# Patient Record
Sex: MALE | Race: WHITE
[De-identification: names, ages, dates, MRNs, and addresses within clinical notes are randomized per-mention and may not be internally consistent; named-entity substitution may affect disease eponyms.]

---

## 2017-03-14 ENCOUNTER — APPOINTMENT (OUTPATIENT)
Dept: ELECTROPHYSIOLOGY | Facility: CLINIC | Age: 67
End: 2017-03-14

## 2017-06-12 ENCOUNTER — APPOINTMENT (OUTPATIENT)
Dept: ELECTROPHYSIOLOGY | Facility: CLINIC | Age: 67
End: 2017-06-12

## 2017-07-12 ENCOUNTER — APPOINTMENT (OUTPATIENT)
Dept: ELECTROPHYSIOLOGY | Facility: CLINIC | Age: 67
End: 2017-07-12

## 2017-08-10 ENCOUNTER — APPOINTMENT (OUTPATIENT)
Dept: ELECTROPHYSIOLOGY | Facility: CLINIC | Age: 67
End: 2017-08-10
Payer: MEDICARE

## 2017-08-10 VITALS
HEIGHT: 67 IN | HEART RATE: 67 BPM | BODY MASS INDEX: 23.86 KG/M2 | SYSTOLIC BLOOD PRESSURE: 122 MMHG | WEIGHT: 152 LBS | DIASTOLIC BLOOD PRESSURE: 82 MMHG

## 2017-08-10 PROCEDURE — 93280 PM DEVICE PROGR EVAL DUAL: CPT

## 2017-11-16 ENCOUNTER — APPOINTMENT (OUTPATIENT)
Dept: ELECTROPHYSIOLOGY | Facility: CLINIC | Age: 67
End: 2017-11-16
Payer: MEDICARE

## 2017-11-16 PROCEDURE — 93294 REM INTERROG EVL PM/LDLS PM: CPT

## 2017-11-16 PROCEDURE — 93296 REM INTERROG EVL PM/IDS: CPT

## 2018-03-13 ENCOUNTER — APPOINTMENT (OUTPATIENT)
Dept: ELECTROPHYSIOLOGY | Facility: CLINIC | Age: 68
End: 2018-03-13
Payer: MEDICARE

## 2018-03-13 PROCEDURE — 93294 REM INTERROG EVL PM/LDLS PM: CPT

## 2018-03-13 PROCEDURE — 93296 REM INTERROG EVL PM/IDS: CPT

## 2018-06-19 ENCOUNTER — APPOINTMENT (OUTPATIENT)
Dept: ELECTROPHYSIOLOGY | Facility: CLINIC | Age: 68
End: 2018-06-19
Payer: MEDICARE

## 2018-06-19 PROCEDURE — 93296 REM INTERROG EVL PM/IDS: CPT

## 2018-06-19 PROCEDURE — 93294 REM INTERROG EVL PM/LDLS PM: CPT

## 2018-08-29 ENCOUNTER — APPOINTMENT (OUTPATIENT)
Dept: ELECTROPHYSIOLOGY | Facility: CLINIC | Age: 68
End: 2018-08-29
Payer: MEDICARE

## 2018-08-29 VITALS
HEIGHT: 67 IN | WEIGHT: 152 LBS | BODY MASS INDEX: 23.86 KG/M2 | DIASTOLIC BLOOD PRESSURE: 76 MMHG | SYSTOLIC BLOOD PRESSURE: 122 MMHG | HEART RATE: 72 BPM

## 2018-08-29 PROCEDURE — 93280 PM DEVICE PROGR EVAL DUAL: CPT

## 2018-10-17 ENCOUNTER — APPOINTMENT (OUTPATIENT)
Dept: ELECTROPHYSIOLOGY | Facility: CLINIC | Age: 68
End: 2018-10-17
Payer: MEDICARE

## 2018-10-17 VITALS
WEIGHT: 152 LBS | HEART RATE: 68 BPM | BODY MASS INDEX: 23.86 KG/M2 | SYSTOLIC BLOOD PRESSURE: 130 MMHG | DIASTOLIC BLOOD PRESSURE: 82 MMHG | HEIGHT: 67 IN

## 2018-10-17 DIAGNOSIS — Z78.9 OTHER SPECIFIED HEALTH STATUS: ICD-10-CM

## 2018-10-17 PROCEDURE — 93280 PM DEVICE PROGR EVAL DUAL: CPT

## 2018-10-17 RX ORDER — VALSARTAN 160 MG/1
160 TABLET, COATED ORAL
Refills: 0 | Status: ACTIVE | COMMUNITY

## 2018-11-21 ENCOUNTER — APPOINTMENT (OUTPATIENT)
Dept: ELECTROPHYSIOLOGY | Facility: CLINIC | Age: 68
End: 2018-11-21
Payer: MEDICARE

## 2018-11-21 PROCEDURE — 93296 REM INTERROG EVL PM/IDS: CPT

## 2018-11-21 PROCEDURE — 93294 REM INTERROG EVL PM/LDLS PM: CPT

## 2019-02-25 ENCOUNTER — APPOINTMENT (OUTPATIENT)
Dept: ELECTROPHYSIOLOGY | Facility: CLINIC | Age: 69
End: 2019-02-25

## 2019-05-30 ENCOUNTER — APPOINTMENT (OUTPATIENT)
Dept: ELECTROPHYSIOLOGY | Facility: CLINIC | Age: 69
End: 2019-05-30
Payer: MEDICARE

## 2019-05-30 PROCEDURE — 93296 REM INTERROG EVL PM/IDS: CPT

## 2019-05-30 PROCEDURE — 93294 REM INTERROG EVL PM/LDLS PM: CPT

## 2019-08-29 ENCOUNTER — APPOINTMENT (OUTPATIENT)
Dept: ELECTROPHYSIOLOGY | Facility: CLINIC | Age: 69
End: 2019-08-29
Payer: MEDICARE

## 2019-08-29 VITALS
BODY MASS INDEX: 23.86 KG/M2 | SYSTOLIC BLOOD PRESSURE: 126 MMHG | HEART RATE: 64 BPM | WEIGHT: 152 LBS | DIASTOLIC BLOOD PRESSURE: 80 MMHG | HEIGHT: 67 IN

## 2019-08-29 PROCEDURE — 93280 PM DEVICE PROGR EVAL DUAL: CPT

## 2019-11-25 ENCOUNTER — APPOINTMENT (OUTPATIENT)
Dept: ELECTROPHYSIOLOGY | Facility: CLINIC | Age: 69
End: 2019-11-25
Payer: MEDICARE

## 2019-11-25 PROCEDURE — 93296 REM INTERROG EVL PM/IDS: CPT

## 2019-11-25 PROCEDURE — 93294 REM INTERROG EVL PM/LDLS PM: CPT

## 2020-03-02 ENCOUNTER — APPOINTMENT (OUTPATIENT)
Dept: ELECTROPHYSIOLOGY | Facility: CLINIC | Age: 70
End: 2020-03-02

## 2020-04-01 ENCOUNTER — APPOINTMENT (OUTPATIENT)
Dept: OTOLARYNGOLOGY | Facility: CLINIC | Age: 70
End: 2020-04-01

## 2020-04-16 ENCOUNTER — APPOINTMENT (OUTPATIENT)
Dept: ELECTROPHYSIOLOGY | Facility: CLINIC | Age: 70
End: 2020-04-16
Payer: MEDICARE

## 2020-04-16 PROCEDURE — 93296 REM INTERROG EVL PM/IDS: CPT

## 2020-04-16 PROCEDURE — 93294 REM INTERROG EVL PM/LDLS PM: CPT

## 2020-07-17 ENCOUNTER — APPOINTMENT (OUTPATIENT)
Dept: ELECTROPHYSIOLOGY | Facility: CLINIC | Age: 70
End: 2020-07-17
Payer: MEDICARE

## 2020-07-17 PROCEDURE — 93296 REM INTERROG EVL PM/IDS: CPT

## 2020-07-17 PROCEDURE — 93294 REM INTERROG EVL PM/LDLS PM: CPT

## 2020-08-08 ENCOUNTER — TRANSCRIPTION ENCOUNTER (OUTPATIENT)
Age: 70
End: 2020-08-08

## 2020-10-22 ENCOUNTER — APPOINTMENT (OUTPATIENT)
Dept: ELECTROPHYSIOLOGY | Facility: CLINIC | Age: 70
End: 2020-10-22
Payer: MEDICARE

## 2020-10-22 VITALS
DIASTOLIC BLOOD PRESSURE: 80 MMHG | BODY MASS INDEX: 23.7 KG/M2 | HEIGHT: 67 IN | OXYGEN SATURATION: 100 % | WEIGHT: 151 LBS | SYSTOLIC BLOOD PRESSURE: 133 MMHG | HEART RATE: 72 BPM

## 2020-10-22 PROCEDURE — 93280 PM DEVICE PROGR EVAL DUAL: CPT

## 2021-01-21 ENCOUNTER — APPOINTMENT (OUTPATIENT)
Dept: ELECTROPHYSIOLOGY | Facility: CLINIC | Age: 71
End: 2021-01-21
Payer: MEDICARE

## 2021-01-22 PROCEDURE — 93294 REM INTERROG EVL PM/LDLS PM: CPT

## 2021-01-22 PROCEDURE — 93296 REM INTERROG EVL PM/IDS: CPT

## 2021-04-22 ENCOUNTER — APPOINTMENT (OUTPATIENT)
Dept: ELECTROPHYSIOLOGY | Facility: CLINIC | Age: 71
End: 2021-04-22

## 2021-05-09 ENCOUNTER — APPOINTMENT (OUTPATIENT)
Dept: ELECTROPHYSIOLOGY | Facility: CLINIC | Age: 71
End: 2021-05-09
Payer: MEDICARE

## 2021-05-10 ENCOUNTER — NON-APPOINTMENT (OUTPATIENT)
Age: 71
End: 2021-05-10

## 2021-05-10 PROCEDURE — 93294 REM INTERROG EVL PM/LDLS PM: CPT

## 2021-05-10 PROCEDURE — 93296 REM INTERROG EVL PM/IDS: CPT

## 2021-08-09 ENCOUNTER — APPOINTMENT (OUTPATIENT)
Dept: ELECTROPHYSIOLOGY | Facility: CLINIC | Age: 71
End: 2021-08-09
Payer: MEDICARE

## 2021-08-10 ENCOUNTER — NON-APPOINTMENT (OUTPATIENT)
Age: 71
End: 2021-08-10

## 2021-08-10 PROCEDURE — 93296 REM INTERROG EVL PM/IDS: CPT

## 2021-08-10 PROCEDURE — 93294 REM INTERROG EVL PM/LDLS PM: CPT

## 2021-10-04 ENCOUNTER — APPOINTMENT (OUTPATIENT)
Dept: ELECTROPHYSIOLOGY | Facility: CLINIC | Age: 71
End: 2021-10-04
Payer: MEDICARE

## 2021-10-04 VITALS
DIASTOLIC BLOOD PRESSURE: 79 MMHG | HEIGHT: 67 IN | WEIGHT: 150 LBS | BODY MASS INDEX: 23.54 KG/M2 | SYSTOLIC BLOOD PRESSURE: 137 MMHG | HEART RATE: 73 BPM | OXYGEN SATURATION: 98 %

## 2021-10-04 PROCEDURE — 93280 PM DEVICE PROGR EVAL DUAL: CPT

## 2021-10-04 RX ORDER — ROSUVASTATIN CALCIUM 20 MG/1
20 TABLET, FILM COATED ORAL DAILY
Refills: 0 | Status: ACTIVE | COMMUNITY

## 2021-11-29 ENCOUNTER — APPOINTMENT (OUTPATIENT)
Dept: OTOLARYNGOLOGY | Facility: CLINIC | Age: 71
End: 2021-11-29
Payer: MEDICARE

## 2021-11-29 VITALS
SYSTOLIC BLOOD PRESSURE: 134 MMHG | HEIGHT: 67 IN | WEIGHT: 150 LBS | BODY MASS INDEX: 23.54 KG/M2 | TEMPERATURE: 97.3 F | HEART RATE: 73 BPM | DIASTOLIC BLOOD PRESSURE: 75 MMHG

## 2021-11-29 DIAGNOSIS — J33.9 NASAL POLYP, UNSPECIFIED: ICD-10-CM

## 2021-11-29 DIAGNOSIS — K21.9 GASTRO-ESOPHAGEAL REFLUX DISEASE W/OUT ESOPHAGITIS: ICD-10-CM

## 2021-11-29 DIAGNOSIS — J34.2 DEVIATED NASAL SEPTUM: ICD-10-CM

## 2021-11-29 DIAGNOSIS — J32.0 CHRONIC MAXILLARY SINUSITIS: ICD-10-CM

## 2021-11-29 PROCEDURE — 99204 OFFICE O/P NEW MOD 45 MIN: CPT | Mod: 25

## 2021-11-29 PROCEDURE — 31231 NASAL ENDOSCOPY DX: CPT

## 2021-11-29 NOTE — REASON FOR VISIT
[Initial Consultation] : an initial consultation for [Sinusitis] : sinusitis [FreeTextEntry2] : awful smell and sinus infection

## 2021-11-29 NOTE — REVIEW OF SYSTEMS
[Post Nasal Drip] : post nasal drip [Dizziness] : dizziness [Nasal Congestion] : nasal congestion [Sinus Pain] : sinus pain [Sinus Pressure] : sinus pressure [Sense Of Smell Problem] : sense of smell problem [Hoarseness] : hoarseness [Throat Clearing] : throat clearing [Throat Pain] : throat pain [Throat Dryness] : throat dryness [Negative] : Heme/Lymph

## 2021-11-29 NOTE — HISTORY OF PRESENT ILLNESS
[de-identified] : C/o intermittent issues with foul smell in nose for past few years.  Also hx  of sinus infections in past.  Now c/o nasal congestion and occ hx of raspy voice.   Now has problems breathing thru nose.  Patient self treated with flonase.   Occ hx of sinus problems.  Also was seen by [facility] neurology and had neg CT of brain.  \par Problem with nose bilateral.  No nasal discharge.  Had negative COVID test today.  (PCR)

## 2021-11-29 NOTE — ASSESSMENT
[FreeTextEntry1] : Patient with problems with sense of smell and nasal congestion.  Also has raspy voice.  Exam shows some nasal purulence and dns to right with bilateral nasal polyps.   Also has some findings of reflux - hx of reflux in past.\par Recommended augmentin and also discussed medrol  (patient will check with GI MD and cardiology prior to starting steroid) - also recommended use of famotidine for reflux.\par Will get CT of sinuses after meds finished.   Follow up in 4-5 weeks - pending findings may need rhinology eval.

## 2021-12-11 ENCOUNTER — APPOINTMENT (OUTPATIENT)
Dept: CT IMAGING | Facility: CLINIC | Age: 71
End: 2021-12-11

## 2021-12-15 ENCOUNTER — APPOINTMENT (OUTPATIENT)
Dept: OTOLARYNGOLOGY | Facility: CLINIC | Age: 71
End: 2021-12-15

## 2021-12-22 ENCOUNTER — APPOINTMENT (OUTPATIENT)
Dept: ELECTROPHYSIOLOGY | Facility: CLINIC | Age: 71
End: 2021-12-22
Payer: MEDICARE

## 2021-12-23 ENCOUNTER — NON-APPOINTMENT (OUTPATIENT)
Age: 71
End: 2021-12-23

## 2021-12-23 PROCEDURE — 93296 REM INTERROG EVL PM/IDS: CPT | Mod: NC

## 2021-12-23 PROCEDURE — 93294 REM INTERROG EVL PM/LDLS PM: CPT

## 2022-01-06 ENCOUNTER — APPOINTMENT (OUTPATIENT)
Dept: OTOLARYNGOLOGY | Facility: CLINIC | Age: 72
End: 2022-01-06

## 2022-03-23 ENCOUNTER — APPOINTMENT (OUTPATIENT)
Dept: ELECTROPHYSIOLOGY | Facility: CLINIC | Age: 72
End: 2022-03-23

## 2022-03-24 ENCOUNTER — FORM ENCOUNTER (OUTPATIENT)
Age: 72
End: 2022-03-24

## 2022-03-30 ENCOUNTER — APPOINTMENT (OUTPATIENT)
Dept: ELECTROPHYSIOLOGY | Facility: CLINIC | Age: 72
End: 2022-03-30
Payer: MEDICARE

## 2022-03-31 ENCOUNTER — NON-APPOINTMENT (OUTPATIENT)
Age: 72
End: 2022-03-31

## 2022-03-31 PROCEDURE — 93294 REM INTERROG EVL PM/LDLS PM: CPT

## 2022-03-31 PROCEDURE — 93296 REM INTERROG EVL PM/IDS: CPT

## 2022-04-04 ENCOUNTER — APPOINTMENT (OUTPATIENT)
Dept: UROLOGY | Facility: CLINIC | Age: 72
End: 2022-04-04
Payer: MEDICARE

## 2022-04-04 VITALS
TEMPERATURE: 98 F | WEIGHT: 153 LBS | DIASTOLIC BLOOD PRESSURE: 72 MMHG | SYSTOLIC BLOOD PRESSURE: 125 MMHG | HEART RATE: 73 BPM | HEIGHT: 67 IN | BODY MASS INDEX: 24.01 KG/M2

## 2022-04-04 PROCEDURE — 99204 OFFICE O/P NEW MOD 45 MIN: CPT

## 2022-04-04 NOTE — PHYSICAL EXAM
[General Appearance - Well Developed] : well developed [General Appearance - Well Nourished] : well nourished [Normal Appearance] : normal appearance [Well Groomed] : well groomed [General Appearance - In No Acute Distress] : no acute distress [Edema] : no peripheral edema [Respiration, Rhythm And Depth] : normal respiratory rhythm and effort [Exaggerated Use Of Accessory Muscles For Inspiration] : no accessory muscle use [Abdomen Soft] : soft [Abdomen Tenderness] : non-tender [Costovertebral Angle Tenderness] : no ~M costovertebral angle tenderness [Urethral Meatus] : meatus normal [Urinary Bladder Findings] : the bladder was normal on palpation [Scrotum] : the scrotum was normal [Testes Mass (___cm)] : there were no testicular masses [Normal Station and Gait] : the gait and station were normal for the patient's age [] : no rash [No Focal Deficits] : no focal deficits [Oriented To Time, Place, And Person] : oriented to person, place, and time [Affect] : the affect was normal [Mood] : the mood was normal [Not Anxious] : not anxious [No Palpable Adenopathy] : no palpable adenopathy [FreeTextEntry1] : tender prostatic fossa

## 2022-04-04 NOTE — HISTORY OF PRESENT ILLNESS
[FreeTextEntry1] : 71 year old man seen 04/04/2022 with complaint of pelvic pain. This began weeks ago, after increased sexual activity while on vacation. Pain recurs with ejaculation. Of note, he is s/p brachytherapy for prostate cancer GS 3+4=7 in 2013. PSA undetectable since, most recently 4/01/2022. \par It is moderate in severity. Nothing makes the symptoms better, nothing makes sx worse. \par It is associated with nothing.\par No hematuria, no dysuria, no frequency, no urgency, no hesitancy, no straining. No incontinence. \par No fevers, no chills, no nausea, no vomiting, no flank pain.

## 2022-04-04 NOTE — ASSESSMENT
[FreeTextEntry1] : 71 year old M with history and exam concerning for prostatitis. Recommended 2 week course of antibiotics. Pt will return for visit in 3 weeks for symptom check. If symptoms have resolved and not returned, we will observe. If symptoms improved, but returned with cessation of antibiotics, he will resume antibiotics for full 6 week course. If 2 weeks of antibiotics have no change in symptoms, will search for alternative diagnosis. Pt agrees and understands. \par \par For prostate cancer, will plan for PSA in 6 months.

## 2022-04-05 LAB
APPEARANCE: CLEAR
BACTERIA: NEGATIVE
BILIRUBIN URINE: NEGATIVE
BLOOD URINE: NEGATIVE
COLOR: YELLOW
GLUCOSE QUALITATIVE U: NEGATIVE
HYALINE CASTS: 0 /LPF
KETONES URINE: NEGATIVE
LEUKOCYTE ESTERASE URINE: NEGATIVE
MICROSCOPIC-UA: NORMAL
NITRITE URINE: NEGATIVE
PH URINE: 6
PROTEIN URINE: NORMAL
RED BLOOD CELLS URINE: 2 /HPF
SPECIFIC GRAVITY URINE: 1.03
SQUAMOUS EPITHELIAL CELLS: 0 /HPF
UROBILINOGEN URINE: NORMAL
WHITE BLOOD CELLS URINE: 0 /HPF

## 2022-04-06 LAB — BACTERIA UR CULT: NORMAL

## 2022-04-25 ENCOUNTER — APPOINTMENT (OUTPATIENT)
Dept: UROLOGY | Facility: CLINIC | Age: 72
End: 2022-04-25
Payer: MEDICARE

## 2022-04-25 VITALS — HEIGHT: 67 IN | TEMPERATURE: 98.2 F | BODY MASS INDEX: 24.01 KG/M2 | RESPIRATION RATE: 16 BRPM | WEIGHT: 153 LBS

## 2022-04-25 DIAGNOSIS — N41.0 ACUTE PROSTATITIS: ICD-10-CM

## 2022-04-25 DIAGNOSIS — Z85.46 PERSONAL HISTORY OF MALIGNANT NEOPLASM OF PROSTATE: ICD-10-CM

## 2022-04-25 PROCEDURE — 99213 OFFICE O/P EST LOW 20 MIN: CPT

## 2022-04-25 RX ORDER — ALPRAZOLAM 0.25 MG/1
0.25 TABLET ORAL
Qty: 30 | Refills: 0 | Status: ACTIVE | COMMUNITY
Start: 2022-04-06

## 2022-04-25 NOTE — HISTORY OF PRESENT ILLNESS
[FreeTextEntry1] : 71 year old man seen 04/04/2022 with complaint of pelvic pain. This began weeks ago, after increased sexual activity while on vacation. Pain recurs with ejaculation. Of note, he is s/p brachytherapy for prostate cancer GS 3+4=7 in 2013. PSA undetectable since, most recently 4/01/2022. \par It is moderate in severity. Nothing makes the symptoms better, nothing makes sx worse. \par It is associated with nothing.\par No hematuria, no dysuria, no frequency, no urgency, no hesitancy, no straining. No incontinence. \par No fevers, no chills, no nausea, no vomiting, no flank pain. \par \par 04/25/2022: Patient presents for follow up. He reports pelvic pain and pain with ejaculation have resolved. No acute complaints. [place] [facility] [place]

## 2022-04-25 NOTE — ASSESSMENT
[FreeTextEntry1] : 71 year old M with history and exam concerning for prostatitis. Now resolved with 2 week course of antibiotics. RTO or call if symptoms recur.\par \par [place] cancer, [place] [facility] [place]

## 2022-06-29 ENCOUNTER — APPOINTMENT (OUTPATIENT)
Dept: ELECTROPHYSIOLOGY | Facility: CLINIC | Age: 72
End: 2022-06-29

## 2022-06-30 ENCOUNTER — NON-APPOINTMENT (OUTPATIENT)
Age: 72
End: 2022-06-30

## 2022-06-30 PROCEDURE — 93294 REM INTERROG EVL PM/LDLS PM: CPT

## 2022-06-30 PROCEDURE — 93296 REM INTERROG EVL PM/IDS: CPT

## 2022-08-02 ENCOUNTER — APPOINTMENT (OUTPATIENT)
Dept: UROLOGY | Facility: CLINIC | Age: 72
End: 2022-08-02

## 2022-08-02 VITALS
HEART RATE: 72 BPM | WEIGHT: 153 LBS | BODY MASS INDEX: 24.01 KG/M2 | HEIGHT: 67 IN | OXYGEN SATURATION: 97 % | DIASTOLIC BLOOD PRESSURE: 75 MMHG | SYSTOLIC BLOOD PRESSURE: 121 MMHG

## 2022-08-02 DIAGNOSIS — N28.1 CYST OF KIDNEY, ACQUIRED: ICD-10-CM

## 2022-08-02 PROCEDURE — 99213 OFFICE O/P EST LOW 20 MIN: CPT

## 2022-08-02 NOTE — HISTORY OF PRESENT ILLNESS
[FreeTextEntry1] : 71 year old man seen 04/04/2022 with complaint of pelvic pain. This began weeks ago, after increased sexual activity while on vacation. Pain recurs with ejaculation. Of note, he is s/p brachytherapy for prostate cancer GS 3+4=7 in 2013. PSA undetectable since, most recently 4/01/2022. \par It is moderate in severity. Nothing makes the symptoms better, nothing makes sx worse. \par It is associated with nothing.\par No hematuria, no dysuria, no frequency, no urgency, no hesitancy, no straining. No incontinence. \par No fevers, no chills, no nausea, no vomiting, no flank pain. \par \par 04/25/2022: Patient presents for follow up. He reports pelvic pain and pain with ejaculation have resolved. No acute complaints. [place] [facility] [place] \par \par 08/02/2022: Patient presents for follow up. He denies any new LUTS. He had abd US which showed 0.9 renal cyst. No masses or hydro.

## 2022-08-02 NOTE — ASSESSMENT
[FreeTextEntry1] : 72 year old M with incidental finding of small renal cyst. With regards to the renal cysts, pt was told that renal cysts can be evaluated with cross sectional imaging with IV contrast. On CT with contrast, renal cysts are evaluated with the Bosniak classification. Bosniak 1 indicates a simple renal cyst with no contrast enhancement, no solid components. Bosniak 2 cysts have few hairline septations, few calcifications, but no enhancement. Bosniak 2F have more septations, minimal wall thickening, but no enhancement.  Bosniak 3 cyst have cystic and solid enhancing components. Bosniak 4 lesions are solid enhancing masses. Bosniak 1-2 cysts are benign and do not require follow up. Bosniak 2F lesions require follow up imaging. Bosniak 3-4 lesions are concerning for malignancy are require either resection or active surveillance depending on size as well as patient preferences and parameters. \par \par His cyst appears benign on US and is very small. Disucssed repeat US in 6 months vs CT or MRI now. Pt chooses to observe for US. \par \par For prostate cancer, [place] [facility] [place]

## 2022-09-06 ENCOUNTER — APPOINTMENT (OUTPATIENT)
Dept: ELECTROPHYSIOLOGY | Facility: CLINIC | Age: 72
End: 2022-09-06

## 2022-09-06 ENCOUNTER — NON-APPOINTMENT (OUTPATIENT)
Age: 72
End: 2022-09-06

## 2022-09-06 VITALS
SYSTOLIC BLOOD PRESSURE: 143 MMHG | OXYGEN SATURATION: 100 % | RESPIRATION RATE: 16 BRPM | HEART RATE: 66 BPM | DIASTOLIC BLOOD PRESSURE: 79 MMHG | WEIGHT: 153 LBS | BODY MASS INDEX: 24.01 KG/M2 | HEIGHT: 67 IN

## 2022-09-06 VITALS — SYSTOLIC BLOOD PRESSURE: 122 MMHG | DIASTOLIC BLOOD PRESSURE: 70 MMHG

## 2022-09-06 PROCEDURE — 93280 PM DEVICE PROGR EVAL DUAL: CPT

## 2022-09-06 RX ORDER — LEVOFLOXACIN 500 MG/1
500 TABLET, FILM COATED ORAL DAILY
Qty: 14 | Refills: 0 | Status: DISCONTINUED | COMMUNITY
Start: 2022-04-04 | End: 2022-09-06

## 2022-09-06 RX ORDER — RIZATRIPTAN BENZOATE 10 MG/1
10 TABLET, ORALLY DISINTEGRATING ORAL
Qty: 9 | Refills: 0 | Status: DISCONTINUED | COMMUNITY
Start: 2021-02-09 | End: 2022-09-06

## 2022-09-06 RX ORDER — SULFAMETHOXAZOLE AND TRIMETHOPRIM 400; 80 MG/1; MG/1
400-80 TABLET ORAL TWICE DAILY
Qty: 20 | Refills: 0 | Status: DISCONTINUED | COMMUNITY
Start: 2021-11-30 | End: 2022-09-06

## 2022-09-06 RX ORDER — VALACYCLOVIR 1 G/1
1 TABLET, FILM COATED ORAL
Qty: 4 | Refills: 0 | Status: DISCONTINUED | COMMUNITY
Start: 2022-04-14 | End: 2022-09-06

## 2022-09-06 RX ORDER — AZELASTINE HYDROCHLORIDE 137 UG/1
0.1 SPRAY, METERED NASAL
Qty: 30 | Refills: 0 | Status: DISCONTINUED | COMMUNITY
Start: 2022-02-07 | End: 2022-09-06

## 2022-09-06 RX ORDER — METHYLPREDNISOLONE 4 MG/1
4 TABLET ORAL
Qty: 1 | Refills: 0 | Status: DISCONTINUED | COMMUNITY
Start: 2021-11-29 | End: 2022-09-06

## 2022-09-06 RX ORDER — AMOXICILLIN AND CLAVULANATE POTASSIUM 875; 125 MG/1; MG/1
875-125 TABLET, COATED ORAL
Qty: 20 | Refills: 0 | Status: DISCONTINUED | COMMUNITY
Start: 2021-11-29 | End: 2022-09-06

## 2022-09-06 RX ORDER — OMEPRAZOLE 20 MG/1
20 CAPSULE, DELAYED RELEASE ORAL
Qty: 60 | Refills: 0 | Status: DISCONTINUED | COMMUNITY
Start: 2021-12-07 | End: 2022-09-06

## 2022-09-06 RX ORDER — PREDNISONE 10 MG/1
10 TABLET ORAL
Qty: 20 | Refills: 0 | Status: DISCONTINUED | COMMUNITY
Start: 2021-12-07 | End: 2022-09-06

## 2022-11-27 ENCOUNTER — FORM ENCOUNTER (OUTPATIENT)
Age: 72
End: 2022-11-27

## 2022-12-05 ENCOUNTER — APPOINTMENT (OUTPATIENT)
Dept: ELECTROPHYSIOLOGY | Facility: CLINIC | Age: 72
End: 2022-12-05

## 2022-12-06 ENCOUNTER — NON-APPOINTMENT (OUTPATIENT)
Age: 72
End: 2022-12-06

## 2022-12-06 PROCEDURE — 93294 REM INTERROG EVL PM/LDLS PM: CPT

## 2022-12-06 PROCEDURE — 93296 REM INTERROG EVL PM/IDS: CPT

## 2023-02-13 ENCOUNTER — APPOINTMENT (OUTPATIENT)
Dept: CT IMAGING | Facility: CLINIC | Age: 73
End: 2023-02-13
Payer: MEDICARE

## 2023-02-13 ENCOUNTER — APPOINTMENT (OUTPATIENT)
Dept: UROLOGY | Facility: CLINIC | Age: 73
End: 2023-02-13
Payer: MEDICARE

## 2023-02-13 ENCOUNTER — OUTPATIENT (OUTPATIENT)
Dept: OUTPATIENT SERVICES | Facility: HOSPITAL | Age: 73
LOS: 1 days | End: 2023-02-13
Payer: MEDICARE

## 2023-02-13 ENCOUNTER — RESULT REVIEW (OUTPATIENT)
Age: 73
End: 2023-02-13

## 2023-02-13 VITALS
BODY MASS INDEX: 24.17 KG/M2 | SYSTOLIC BLOOD PRESSURE: 153 MMHG | WEIGHT: 154 LBS | DIASTOLIC BLOOD PRESSURE: 74 MMHG | HEART RATE: 73 BPM | HEIGHT: 67 IN

## 2023-02-13 DIAGNOSIS — R10.31 RIGHT LOWER QUADRANT PAIN: ICD-10-CM

## 2023-02-13 PROCEDURE — 74176 CT ABD & PELVIS W/O CONTRAST: CPT | Mod: 26,MH

## 2023-02-13 PROCEDURE — 99213 OFFICE O/P EST LOW 20 MIN: CPT

## 2023-02-13 PROCEDURE — 74176 CT ABD & PELVIS W/O CONTRAST: CPT

## 2023-02-13 NOTE — PHYSICAL EXAM

## 2023-02-13 NOTE — HISTORY OF PRESENT ILLNESS
[FreeTextEntry1] : 71 year old man seen 04/04/2022 with complaint of pelvic pain. This began weeks ago, after increased sexual activity while on vacation. Pain recurs with ejaculation. Of note, he is s/p brachytherapy for prostate cancer GS 3+4=7 in 2013. PSA undetectable since, most recently 4/01/2022. \par It is moderate in severity. Nothing makes the symptoms better, nothing makes sx worse. \par It is associated with nothing.\par No hematuria, no dysuria, no frequency, no urgency, no hesitancy, no straining. No incontinence. \par No fevers, no chills, no nausea, no vomiting, no flank pain. \par \par 04/25/2022: Patient presents for follow up. He reports pelvic pain and pain with ejaculation have resolved. No acute complaints. Trending [place] with [facility] [place]. \par \par 08/02/2022: Patient presents for follow up. He denies any new LUTS. He had abd US which showed 0.9 renal cyst. No masses or hydro. \par \par 02/13/2023: Patient presents for follow up. He reports sudden RIGHT flank pain. He was seen in [place] at urgent care, which suggested CT to eval for recurrence of hernia. He denies nausea, vomiting. +BM, +flatus

## 2023-02-13 NOTE — ASSESSMENT
[FreeTextEntry1] : 72 year old M s/p bilateral hernia repair, now with RIGHT groin pain. Will send for CT, refer to gen surg if recurrence of hernia seen. He will present to ER if pain becomes severe

## 2023-02-17 ENCOUNTER — NON-APPOINTMENT (OUTPATIENT)
Age: 73
End: 2023-02-17

## 2023-02-22 ENCOUNTER — FORM ENCOUNTER (OUTPATIENT)
Age: 73
End: 2023-02-22

## 2023-03-06 ENCOUNTER — APPOINTMENT (OUTPATIENT)
Dept: ELECTROPHYSIOLOGY | Facility: CLINIC | Age: 73
End: 2023-03-06
Payer: MEDICARE

## 2023-03-07 ENCOUNTER — NON-APPOINTMENT (OUTPATIENT)
Age: 73
End: 2023-03-07

## 2023-03-07 PROCEDURE — 93296 REM INTERROG EVL PM/IDS: CPT

## 2023-03-07 PROCEDURE — 93294 REM INTERROG EVL PM/LDLS PM: CPT

## 2023-05-07 ENCOUNTER — FORM ENCOUNTER (OUTPATIENT)
Age: 73
End: 2023-05-07

## 2023-06-05 ENCOUNTER — APPOINTMENT (OUTPATIENT)
Dept: ELECTROPHYSIOLOGY | Facility: CLINIC | Age: 73
End: 2023-06-05
Payer: MEDICARE

## 2023-06-06 ENCOUNTER — NON-APPOINTMENT (OUTPATIENT)
Age: 73
End: 2023-06-06

## 2023-06-06 PROCEDURE — 93296 REM INTERROG EVL PM/IDS: CPT

## 2023-06-06 PROCEDURE — 93294 REM INTERROG EVL PM/LDLS PM: CPT

## 2023-09-06 ENCOUNTER — NON-APPOINTMENT (OUTPATIENT)
Age: 73
End: 2023-09-06

## 2023-09-06 ENCOUNTER — APPOINTMENT (OUTPATIENT)
Dept: ELECTROPHYSIOLOGY | Facility: CLINIC | Age: 73
End: 2023-09-06
Payer: MEDICARE

## 2023-09-06 VITALS
SYSTOLIC BLOOD PRESSURE: 121 MMHG | BODY MASS INDEX: 24.33 KG/M2 | HEART RATE: 67 BPM | DIASTOLIC BLOOD PRESSURE: 72 MMHG | OXYGEN SATURATION: 100 % | WEIGHT: 155 LBS | HEIGHT: 67 IN

## 2023-09-06 DIAGNOSIS — Z86.79 PERSONAL HISTORY OF OTHER DISEASES OF THE CIRCULATORY SYSTEM: ICD-10-CM

## 2023-09-06 PROCEDURE — 93280 PM DEVICE PROGR EVAL DUAL: CPT

## 2023-09-06 RX ORDER — FAMOTIDINE 20 MG/1
20 TABLET, FILM COATED ORAL
Qty: 90 | Refills: 3 | Status: DISCONTINUED | COMMUNITY
Start: 2021-11-29 | End: 2023-09-06

## 2023-09-06 RX ORDER — FLUTICASONE PROPIONATE 50 UG/1
50 SPRAY, METERED NASAL DAILY
Qty: 1 | Refills: 1 | Status: DISCONTINUED | COMMUNITY
Start: 2021-11-29 | End: 2023-09-06

## 2023-12-05 ENCOUNTER — APPOINTMENT (OUTPATIENT)
Dept: ELECTROPHYSIOLOGY | Facility: CLINIC | Age: 73
End: 2023-12-05
Payer: MEDICARE

## 2023-12-06 ENCOUNTER — NON-APPOINTMENT (OUTPATIENT)
Age: 73
End: 2023-12-06

## 2023-12-06 PROCEDURE — 93296 REM INTERROG EVL PM/IDS: CPT

## 2023-12-06 PROCEDURE — 93294 REM INTERROG EVL PM/LDLS PM: CPT

## 2023-12-26 ENCOUNTER — APPOINTMENT (OUTPATIENT)
Dept: ELECTROPHYSIOLOGY | Facility: CLINIC | Age: 73
End: 2023-12-26
Payer: MEDICARE

## 2023-12-26 ENCOUNTER — NON-APPOINTMENT (OUTPATIENT)
Age: 73
End: 2023-12-26

## 2023-12-26 DIAGNOSIS — Z95.0 PRESENCE OF CARDIAC PACEMAKER: ICD-10-CM

## 2023-12-26 DIAGNOSIS — I49.5 SICK SINUS SYNDROME: ICD-10-CM

## 2023-12-26 PROCEDURE — 93280 PM DEVICE PROGR EVAL DUAL: CPT

## 2024-03-05 ENCOUNTER — APPOINTMENT (OUTPATIENT)
Dept: ELECTROPHYSIOLOGY | Facility: CLINIC | Age: 74
End: 2024-03-05
Payer: MEDICARE

## 2024-03-06 ENCOUNTER — NON-APPOINTMENT (OUTPATIENT)
Age: 74
End: 2024-03-06

## 2024-03-06 PROCEDURE — 93294 REM INTERROG EVL PM/LDLS PM: CPT

## 2024-03-06 PROCEDURE — 93296 REM INTERROG EVL PM/IDS: CPT

## 2024-05-08 ENCOUNTER — APPOINTMENT (OUTPATIENT)
Dept: SURGERY | Facility: CLINIC | Age: 74
End: 2024-05-08
Payer: MEDICARE

## 2024-05-08 ENCOUNTER — NON-APPOINTMENT (OUTPATIENT)
Age: 74
End: 2024-05-08

## 2024-05-08 VITALS
SYSTOLIC BLOOD PRESSURE: 122 MMHG | HEIGHT: 67 IN | WEIGHT: 154 LBS | BODY MASS INDEX: 24.17 KG/M2 | HEART RATE: 76 BPM | OXYGEN SATURATION: 99 % | DIASTOLIC BLOOD PRESSURE: 74 MMHG

## 2024-05-08 DIAGNOSIS — R10.31 RIGHT LOWER QUADRANT PAIN: ICD-10-CM

## 2024-05-08 PROCEDURE — 99203 OFFICE O/P NEW LOW 30 MIN: CPT

## 2024-05-08 NOTE — REASON FOR VISIT
[Consultation] : a consultation visit [Spouse] : spouse [FreeTextEntry1] : right inguinal pain getting worse over the last year.

## 2024-05-08 NOTE — PHYSICAL EXAM
[JVD] : no jugular venous distention  [Normal Breath Sounds] : Normal breath sounds [Abdomen Tenderness] : ~T ~M No abdominal tenderness [de-identified] : NAD [de-identified] : no obvious hernia on the right noted, non-tender at this time.

## 2024-05-09 ENCOUNTER — RESULT REVIEW (OUTPATIENT)
Age: 74
End: 2024-05-09

## 2024-05-09 ENCOUNTER — OUTPATIENT (OUTPATIENT)
Dept: OUTPATIENT SERVICES | Facility: HOSPITAL | Age: 74
LOS: 1 days | End: 2024-05-09
Payer: MEDICARE

## 2024-05-09 ENCOUNTER — APPOINTMENT (OUTPATIENT)
Dept: CT IMAGING | Facility: CLINIC | Age: 74
End: 2024-05-09
Payer: MEDICARE

## 2024-05-09 DIAGNOSIS — R10.31 RIGHT LOWER QUADRANT PAIN: ICD-10-CM

## 2024-05-09 PROCEDURE — 74177 CT ABD & PELVIS W/CONTRAST: CPT | Mod: 26,MH

## 2024-05-09 PROCEDURE — 74177 CT ABD & PELVIS W/CONTRAST: CPT | Mod: MH

## 2024-05-10 ENCOUNTER — APPOINTMENT (OUTPATIENT)
Dept: UROLOGY | Facility: CLINIC | Age: 74
End: 2024-05-10
Payer: MEDICARE

## 2024-05-10 DIAGNOSIS — N50.811 RIGHT TESTICULAR PAIN: ICD-10-CM

## 2024-05-10 PROCEDURE — 99214 OFFICE O/P EST MOD 30 MIN: CPT

## 2024-05-11 ENCOUNTER — APPOINTMENT (OUTPATIENT)
Dept: ULTRASOUND IMAGING | Facility: CLINIC | Age: 74
End: 2024-05-11
Payer: MEDICARE

## 2024-05-11 ENCOUNTER — OUTPATIENT (OUTPATIENT)
Dept: OUTPATIENT SERVICES | Facility: HOSPITAL | Age: 74
LOS: 1 days | End: 2024-05-11
Payer: MEDICARE

## 2024-05-11 ENCOUNTER — RESULT REVIEW (OUTPATIENT)
Age: 74
End: 2024-05-11

## 2024-05-11 DIAGNOSIS — N50.811 RIGHT TESTICULAR PAIN: ICD-10-CM

## 2024-05-11 PROCEDURE — 93975 VASCULAR STUDY: CPT

## 2024-05-11 PROCEDURE — 76870 US EXAM SCROTUM: CPT

## 2024-05-11 PROCEDURE — 76870 US EXAM SCROTUM: CPT | Mod: 26

## 2024-05-11 PROCEDURE — 93975 VASCULAR STUDY: CPT | Mod: 26

## 2024-05-14 LAB
APPEARANCE: CLEAR
BACTERIA UR CULT: NORMAL
BACTERIA: NEGATIVE /HPF
BILIRUBIN URINE: NEGATIVE
BLOOD URINE: NEGATIVE
CAST: 0 /LPF
COLOR: YELLOW
EPITHELIAL CELLS: 0 /HPF
GLUCOSE QUALITATIVE U: NEGATIVE MG/DL
KETONES URINE: NEGATIVE MG/DL
LEUKOCYTE ESTERASE URINE: NEGATIVE
MICROSCOPIC-UA: NORMAL
NITRITE URINE: NEGATIVE
PH URINE: 6.5
PROTEIN URINE: NEGATIVE MG/DL
RED BLOOD CELLS URINE: 0 /HPF
SPECIFIC GRAVITY URINE: 1.02
UROBILINOGEN URINE: 0.2 MG/DL
WHITE BLOOD CELLS URINE: 0 /HPF

## 2024-05-15 ENCOUNTER — APPOINTMENT (OUTPATIENT)
Dept: SURGERY | Facility: CLINIC | Age: 74
End: 2024-05-15
Payer: MEDICARE

## 2024-05-15 VITALS
SYSTOLIC BLOOD PRESSURE: 126 MMHG | BODY MASS INDEX: 24.17 KG/M2 | OXYGEN SATURATION: 98 % | TEMPERATURE: 97.8 F | WEIGHT: 154 LBS | DIASTOLIC BLOOD PRESSURE: 74 MMHG | HEIGHT: 67 IN | HEART RATE: 74 BPM

## 2024-05-15 DIAGNOSIS — K52.9 NONINFECTIVE GASTROENTERITIS AND COLITIS, UNSPECIFIED: ICD-10-CM

## 2024-05-15 PROCEDURE — 99214 OFFICE O/P EST MOD 30 MIN: CPT

## 2024-05-15 NOTE — PLAN
[FreeTextEntry1] : f/u with GI for colonoscopy and GI workup. no acute surgical interventions at this time. f/u as needed.

## 2024-05-15 NOTE — PHYSICAL EXAM
[Normal Breath Sounds] : Normal breath sounds [Abdomen Tenderness] : ~T ~M No abdominal tenderness [de-identified] : NAD

## 2024-05-15 NOTE — HISTORY OF PRESENT ILLNESS
[de-identified] : Patient states bowel habits have been inconsistent. Evaluated by Urologist and cleared. CT reveals proctocolitis and enteritis.  case discussed in detail with patient and spouse.

## 2024-05-21 NOTE — HISTORY OF PRESENT ILLNESS
[FreeTextEntry1] : 05/10/2024: 73-year-old male presents for right-sided groin and testicular pain.  Accompanied by his wife.   Mentions that he has been having right-sided groin pain for the last 3 weeks.  Has had hernia repair 15 years ago. Saw Dr. Fenton and had CT scan of the abdomen and pelvis yesterday.   Also had called Dr. Lai, who patient follows with, and was told to see Dr. Fenton.  Over the last few days, he has also developed right-sided scrotal and testicular pain.  Denies any difficulty with urination. Daytime frequency every 2-3 hours, nocturia 2-3 times.  Denies any dysuria, hematuria, or flank pain.  Denies any history of kidney stones.  Endorses having frequent small bowel movements. Has had constipation. Has taken Metamucil in the past.  Dr. Fenton has mentioned the possibility of doing diagnostic laparoscopy if he continues to have pain and no concerning findings on CT.  Also has history of prostate cancer status post seeds. Follows with [facility] at [place]. PSAs have been very low.  Also has history of hematuria requiring cystoscopy. 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

## 2024-05-21 NOTE — ADDENDUM
[FreeTextEntry1] :  Haily JOSÉ assisted in documentation on 05/10/2024  acting as a scribe for Dr. George Montes.

## 2024-05-21 NOTE — PHYSICAL EXAM
[Normal Appearance] : normal appearance [] : no respiratory distress [General Appearance - In No Acute Distress] : no acute distress [Abdomen Soft] : soft [Abdomen Tenderness] : non-tender [Abdomen Hernia] : no hernia was discovered [Costovertebral Angle Tenderness] : no ~M costovertebral angle tenderness [Urethral Meatus] : meatus normal [Penis Abnormality] : normal circumcised penis [Scrotum] : the scrotum was normal [Epididymis] : the epididymides were normal [Testes Tenderness] : no tenderness of the testes [Testes Mass (___cm)] : there were no testicular masses [Normal Station and Gait] : the gait and station were normal for the patient's age [Oriented To Time, Place, And Person] : oriented to person, place, and time

## 2024-05-21 NOTE — ASSESSMENT
[FreeTextEntry1] : Reviewed records. Discussed labs and imaging.   On review of CT scan images, a lot of gas and stool was seen in the colon.  Recommended to take MiraLax and Metamucil.   Recommended to see gastroenterologist if he continues to have bother.  Will follow up with Dr. Fenton.   Right testicular pain: Will get urinalysis and urine culture.   Will get testicular ultrasound.  Will inform results.   Recommended to go to ED if condition worsens.

## 2024-05-22 RX ORDER — MUPIROCIN 20 MG/G
2 OINTMENT TOPICAL
Qty: 22 | Refills: 0 | Status: COMPLETED | COMMUNITY
Start: 2021-12-28 | End: 2024-05-22

## 2024-06-05 ENCOUNTER — APPOINTMENT (OUTPATIENT)
Dept: ELECTROPHYSIOLOGY | Facility: CLINIC | Age: 74
End: 2024-06-05

## 2024-06-09 ENCOUNTER — APPOINTMENT (OUTPATIENT)
Dept: ELECTROPHYSIOLOGY | Facility: CLINIC | Age: 74
End: 2024-06-09
Payer: MEDICARE

## 2024-06-10 ENCOUNTER — NON-APPOINTMENT (OUTPATIENT)
Age: 74
End: 2024-06-10

## 2024-06-10 PROCEDURE — 93298 REM INTERROG DEV EVAL SCRMS: CPT

## 2024-07-02 ENCOUNTER — EMERGENCY (EMERGENCY)
Facility: HOSPITAL | Age: 74
LOS: 0 days | Discharge: HOME | End: 2024-07-02
Attending: STUDENT IN AN ORGANIZED HEALTH CARE EDUCATION/TRAINING PROGRAM
Payer: MEDICARE

## 2024-07-02 VITALS
DIASTOLIC BLOOD PRESSURE: 70 MMHG | RESPIRATION RATE: 18 BRPM | HEART RATE: 61 BPM | SYSTOLIC BLOOD PRESSURE: 105 MMHG | OXYGEN SATURATION: 99 % | TEMPERATURE: 98 F

## 2024-07-02 DIAGNOSIS — M79.89 OTHER SPECIFIED SOFT TISSUE DISORDERS: ICD-10-CM

## 2024-07-02 DIAGNOSIS — M79.662 PAIN IN LEFT LOWER LEG: ICD-10-CM

## 2024-07-02 DIAGNOSIS — K21.9 GASTRO-ESOPHAGEAL REFLUX DISEASE WITHOUT ESOPHAGITIS: ICD-10-CM

## 2024-07-02 PROCEDURE — 99284 EMERGENCY DEPT VISIT MOD MDM: CPT | Mod: 25

## 2024-07-02 PROCEDURE — 93971 EXTREMITY STUDY: CPT | Mod: LT

## 2024-07-02 PROCEDURE — 99284 EMERGENCY DEPT VISIT MOD MDM: CPT | Mod: FS

## 2024-07-02 PROCEDURE — 93971 EXTREMITY STUDY: CPT | Mod: 26,LT

## 2024-08-20 ENCOUNTER — APPOINTMENT (OUTPATIENT)
Dept: ELECTROPHYSIOLOGY | Facility: CLINIC | Age: 74
End: 2024-08-20
Payer: MEDICARE

## 2024-08-20 ENCOUNTER — NON-APPOINTMENT (OUTPATIENT)
Age: 74
End: 2024-08-20

## 2024-08-20 VITALS
OXYGEN SATURATION: 98 % | BODY MASS INDEX: 24.17 KG/M2 | HEART RATE: 69 BPM | DIASTOLIC BLOOD PRESSURE: 72 MMHG | WEIGHT: 154 LBS | SYSTOLIC BLOOD PRESSURE: 137 MMHG | HEIGHT: 67 IN

## 2024-08-20 PROCEDURE — 93280 PM DEVICE PROGR EVAL DUAL: CPT

## 2024-11-19 ENCOUNTER — APPOINTMENT (OUTPATIENT)
Dept: ELECTROPHYSIOLOGY | Facility: CLINIC | Age: 74
End: 2024-11-19

## 2024-12-19 ENCOUNTER — APPOINTMENT (OUTPATIENT)
Dept: ELECTROPHYSIOLOGY | Facility: CLINIC | Age: 74
End: 2024-12-19

## 2025-01-03 ENCOUNTER — APPOINTMENT (OUTPATIENT)
Dept: ELECTROPHYSIOLOGY | Facility: CLINIC | Age: 75
End: 2025-01-03
Payer: MEDICARE

## 2025-01-03 ENCOUNTER — NON-APPOINTMENT (OUTPATIENT)
Age: 75
End: 2025-01-03

## 2025-01-03 PROCEDURE — 93294 REM INTERROG EVL PM/LDLS PM: CPT

## 2025-01-03 PROCEDURE — 93296 REM INTERROG EVL PM/IDS: CPT

## 2025-01-21 ENCOUNTER — APPOINTMENT (OUTPATIENT)
Dept: ELECTROPHYSIOLOGY | Facility: CLINIC | Age: 75
End: 2025-01-21

## 2025-04-04 ENCOUNTER — APPOINTMENT (OUTPATIENT)
Dept: ELECTROPHYSIOLOGY | Facility: CLINIC | Age: 75
End: 2025-04-04

## 2025-04-17 ENCOUNTER — NON-APPOINTMENT (OUTPATIENT)
Age: 75
End: 2025-04-17

## 2025-04-17 ENCOUNTER — APPOINTMENT (OUTPATIENT)
Dept: ELECTROPHYSIOLOGY | Facility: CLINIC | Age: 75
End: 2025-04-17
Payer: MEDICARE

## 2025-04-17 PROCEDURE — 93296 REM INTERROG EVL PM/IDS: CPT

## 2025-04-17 PROCEDURE — 93294 REM INTERROG EVL PM/LDLS PM: CPT

## 2025-07-17 ENCOUNTER — APPOINTMENT (OUTPATIENT)
Dept: ELECTROPHYSIOLOGY | Facility: CLINIC | Age: 75
End: 2025-07-17
Payer: MEDICARE

## 2025-07-17 ENCOUNTER — NON-APPOINTMENT (OUTPATIENT)
Age: 75
End: 2025-07-17

## 2025-07-17 PROCEDURE — 93296 REM INTERROG EVL PM/IDS: CPT

## 2025-07-17 PROCEDURE — 93294 REM INTERROG EVL PM/LDLS PM: CPT

## 2025-08-01 ENCOUNTER — TRANSCRIPTION ENCOUNTER (OUTPATIENT)
Age: 75
End: 2025-08-01

## 2025-08-15 ENCOUNTER — APPOINTMENT (OUTPATIENT)
Dept: UROLOGY | Facility: CLINIC | Age: 75
End: 2025-08-15

## 2025-08-22 ENCOUNTER — APPOINTMENT (OUTPATIENT)
Dept: ELECTROPHYSIOLOGY | Facility: CLINIC | Age: 75
End: 2025-08-22
Payer: MEDICARE

## 2025-08-22 ENCOUNTER — NON-APPOINTMENT (OUTPATIENT)
Age: 75
End: 2025-08-22

## 2025-08-22 VITALS
WEIGHT: 152 LBS | HEIGHT: 67 IN | OXYGEN SATURATION: 98 % | HEART RATE: 73 BPM | DIASTOLIC BLOOD PRESSURE: 70 MMHG | BODY MASS INDEX: 23.86 KG/M2 | SYSTOLIC BLOOD PRESSURE: 130 MMHG

## 2025-08-22 PROCEDURE — 93280 PM DEVICE PROGR EVAL DUAL: CPT
